# Patient Record
Sex: MALE | NOT HISPANIC OR LATINO
[De-identification: names, ages, dates, MRNs, and addresses within clinical notes are randomized per-mention and may not be internally consistent; named-entity substitution may affect disease eponyms.]

---

## 2020-10-07 PROBLEM — Z00.00 ENCOUNTER FOR PREVENTIVE HEALTH EXAMINATION: Status: ACTIVE | Noted: 2020-10-07

## 2020-10-08 ENCOUNTER — APPOINTMENT (OUTPATIENT)
Dept: SPINE | Facility: CLINIC | Age: 70
End: 2020-10-08

## 2020-10-13 ENCOUNTER — APPOINTMENT (OUTPATIENT)
Dept: SPINE | Facility: CLINIC | Age: 70
End: 2020-10-13
Payer: MEDICARE

## 2020-10-13 VITALS
RESPIRATION RATE: 18 BRPM | DIASTOLIC BLOOD PRESSURE: 66 MMHG | HEIGHT: 67 IN | TEMPERATURE: 97.9 F | BODY MASS INDEX: 27.94 KG/M2 | HEART RATE: 96 BPM | SYSTOLIC BLOOD PRESSURE: 94 MMHG | WEIGHT: 178 LBS | OXYGEN SATURATION: 98 %

## 2020-10-13 DIAGNOSIS — M96.0 PSEUDARTHROSIS AFTER FUSION OR ARTHRODESIS: ICD-10-CM

## 2020-10-13 DIAGNOSIS — I10 ESSENTIAL (PRIMARY) HYPERTENSION: ICD-10-CM

## 2020-10-13 DIAGNOSIS — E78.5 HYPERLIPIDEMIA, UNSPECIFIED: ICD-10-CM

## 2020-10-13 DIAGNOSIS — Z78.9 OTHER SPECIFIED HEALTH STATUS: ICD-10-CM

## 2020-10-13 PROCEDURE — 99204 OFFICE O/P NEW MOD 45 MIN: CPT

## 2020-10-13 RX ORDER — METHADONE HYDROCHLORIDE 5 MG/1
TABLET ORAL
Refills: 0 | Status: ACTIVE | COMMUNITY

## 2020-10-13 RX ORDER — GABAPENTIN 800 MG/1
800 TABLET, COATED ORAL
Refills: 0 | Status: ACTIVE | COMMUNITY

## 2020-10-13 RX ORDER — ATORVASTATIN CALCIUM 80 MG/1
TABLET, FILM COATED ORAL
Refills: 0 | Status: ACTIVE | COMMUNITY

## 2020-10-13 RX ORDER — HYDROCHLOROTHIAZIDE 12.5 MG/1
TABLET ORAL
Refills: 0 | Status: ACTIVE | COMMUNITY

## 2020-10-13 RX ORDER — CYCLOBENZAPRINE HCL 5 MG
TABLET ORAL
Refills: 0 | Status: ACTIVE | COMMUNITY

## 2020-10-13 NOTE — REVIEW OF SYSTEMS
[Tingling] : tingling [Difficulty Walking] : difficulty walking [As Noted in HPI] : as noted in HPI [Negative] : Heme/Lymph

## 2020-10-14 NOTE — DATA REVIEWED
[de-identified] : C/L spine wo on 8/12/2020 @ [facility] [de-identified] : C/L spine wo on 8/12/2020 @ [facility] [de-identified] : C/L spine AP/Lat on 8/12/2020 @ [facility]

## 2020-10-14 NOTE — ASSESSMENT
[FreeTextEntry1] : PLAN\par - Xray scoliosis\par - CT myelogram C/T/L spine\par - refer to Dr. Wilson for SCS consult\par - BACS questionnaire sent to patient's email\par - f/u after images

## 2020-10-14 NOTE — END OF VISIT
[FreeTextEntry3] : It was wonderful to meet the patient today in the office.  He was referred to by office by my partner from [facility].  He has a long history involving both cervical and lumbar surgery.  He has severe neck pain as well as low back pain.  He has residual cervical myelopathy which he says is not bothering him.  He is very active musically and plays the drums every day.  He has severe mechanical back pain with severe shooting pain going down his both legs right worse than left.  I reviewed his films which show a previous L4 S1 fusion with pseudoarthrosis at all levels.  He has nerve root compression.  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  He states that he is unable to sleep.  Furthermore he has a scoliosis and kyphosis which contributes to this coronal deformity.  He does not have scoliosis films but in my analysis of his films I think that he would potentially need a T2 to the pelvis or even a C2 to the pelvis fusion to properly correct this.  Given his medical state and level of activity and treatment preferences I do not think that this is feasible for him.  I recommend a consultation with my colleague for consideration of a spinal cord stimulator.  I think this would be an excellent option for him as neuromodulation would be a great treatment for the thing that bothers him the most which is the severe incapacitating nerve pain.  Also this operation could be done on a outpatient basis then he could get significant pain relief to allow him to resume any of his activities.  He gets along with a Rollator and has some mobility and transportation issues so we will do everything we can to help facilitate his evaluation.  I answered all of his questions to the best of my ability.\par \par Christian Louise M.D., M.Sc.\par \par Department of Neurosurgery\par [facility]\par [facility]\par [facility]\par [place], [place]\par [email or web address]\par [phone] [>50% of the face to face encounter time was spent on counseling and/or coordination of care for ___] : Greater than 50% of the face to face encounter time was spent on counseling and/or coordination of care for [unfilled]

## 2020-10-14 NOTE — PHYSICAL EXAM
[General Appearance - Alert] : alert [General Appearance - In No Acute Distress] : in no acute distress [General Appearance - Well Nourished] : well nourished [General Appearance - Well-Appearing] : healthy appearing [Oriented To Time, Place, And Person] : oriented to person, place, and time [Impaired Insight] : insight and judgment were intact [Affect] : the affect was normal [Memory Recent] : recent memory was not impaired [Person] : oriented to person [Place] : oriented to place [Time] : oriented to time [Motor Tone] : muscle tone was normal in all four extremities [4] : C8 finger flexors 4/5 [5] : S1 flexor hallucis longus 5/5 [Sensation Tactile Decrease] : light touch was intact [Romberg's Sign] : a positive Romberg's sign was present [Limited Balance] : the patient's balance was impaired [3+] : Biceps left 3+ [2+] : Brachioradialis left 2+ [1+] : Ankle jerk left 1+ [Villalba] : Villalba's sign was demonstrated [Able to toe walk] : the patient was not able to toe walk [Able to heel walk] : the patient was not able to heel walk

## 2020-10-14 NOTE — HISTORY OF PRESENT ILLNESS
[de-identified] : Patient is a 71 yo RH M with PMH of HTN, HLD, cervical/lumbar stenosis (h/o cervical spine surgery including C5-7 ACDF in 1980, 2001 and 2009- patient does not remember name of surgeons, L4-S1 decompression/posterior fusion in 8/2018 by Dr. Paulo Craig @ [facility]) presents neurosurgical evaluation.\par He reports persistent lower back pain with LE since his lumbar surgery without improvement. Pain is described as intermittent shooting pain on his lower back radiating to b/l posterior LE which is worsening at night and lying down and unable to recall alleviated factors. He has been seen pain management (Dr. Webster for the past 3 years and has been tx with medications- hydrocodone previously and currently on methadone/gabapentin (800mg TID)/Flexeril (10mg TID) without improvement. He had PT previously for 2 years which aggrevated his symptoms and tried ESIsx 2 times before without relief.  Additionally he c/o moderate left side neck pain to L shoulder and chronic L hand weakness since his second surgery 2001 and also c/o R knee/L elbow limited ROM since 2018 without significant injury which causing mild gait disturbance and difficulty performing ADLs. He denies saddle anesthesia/BB dysfunction. He ambulates with rolling walker.

## 2020-10-20 ENCOUNTER — APPOINTMENT (OUTPATIENT)
Dept: NEUROSURGERY | Facility: CLINIC | Age: 70
End: 2020-10-20
Payer: MEDICARE

## 2020-10-20 DIAGNOSIS — M41.9 SCOLIOSIS, UNSPECIFIED: ICD-10-CM

## 2020-10-20 DIAGNOSIS — S12.9XXA FRACTURE OF NECK, UNSPECIFIED, INITIAL ENCOUNTER: ICD-10-CM

## 2020-10-20 DIAGNOSIS — Z98.1 ARTHRODESIS STATUS: ICD-10-CM

## 2020-10-20 DIAGNOSIS — Z80.9 FAMILY HISTORY OF MALIGNANT NEOPLASM, UNSPECIFIED: ICD-10-CM

## 2020-10-20 DIAGNOSIS — Z82.49 FAMILY HISTORY OF ISCHEMIC HEART DISEASE AND OTHER DISEASES OF THE CIRCULATORY SYSTEM: ICD-10-CM

## 2020-10-20 DIAGNOSIS — M48.02 SPINAL STENOSIS, CERVICAL REGION: ICD-10-CM

## 2020-10-20 DIAGNOSIS — M48.062 SPINAL STENOSIS, LUMBAR REGION WITH NEUROGENIC CLAUDICATION: ICD-10-CM

## 2020-10-20 DIAGNOSIS — S32.009K UNSPECIFIED FRACTURE OF UNSPECIFIED LUMBAR VERTEBRA, SUBSEQUENT ENCOUNTER FOR FRACTURE WITH NONUNION: ICD-10-CM

## 2020-10-20 PROCEDURE — 99072 ADDL SUPL MATRL&STAF TM PHE: CPT

## 2020-10-20 PROCEDURE — 99214 OFFICE O/P EST MOD 30 MIN: CPT

## 2020-10-20 RX ORDER — LOSARTAN POTASSIUM 50 MG/1
50 TABLET, FILM COATED ORAL
Refills: 0 | Status: ACTIVE | COMMUNITY

## 2020-10-20 RX ORDER — AMLODIPINE BESYLATE 5 MG/1
5 TABLET ORAL
Refills: 0 | Status: ACTIVE | COMMUNITY

## 2020-10-20 RX ORDER — ASPIRIN 81 MG
81 TABLET, DELAYED RELEASE (ENTERIC COATED) ORAL
Refills: 0 | Status: ACTIVE | COMMUNITY

## 2020-10-20 NOTE — HISTORY OF PRESENT ILLNESS
[> 3 months] : more  than 3 months [de-identified] : This is a 70 yrs old male who ambulates with a rollator with a history of cervical fusion surgeries (1980, 2001, 2009) and lumbar fusion in 2018 who presents today for a consultation of low back "sensation of tremor" which "shoots' down both legs, posteriorly to the feet since his lumbar spine surgery. He also reports of "sensation of tremor" in his bilateral upper extremities. Denies pain, tingling, weakness. Reports decrease sensation on the right hand. He has failed conservative management such as physical therapy and LAURA. He reports also taking gabapentin and cyclobenzaprine with no improvement. Reports of trouble sleeping, and having to drink a beer to help sleep. He is being followed by Dr. Vasques and Dr. Braden London, pain management. \par \par His case has been reviewed extensively by myself and my colleague Dr. Louise. from a structural point of view deformity correction would require cervico-lumbar fusion. This would be an excessive burden to him. The patient is less concerned regarding pain and deformity and significantly disabled by his refractory radicular/neuropathic pain. He is here to discuss SCS.  [FreeTextEntry1] : low back "tremor" radiating to b/l LE

## 2020-10-20 NOTE — PLAN
[FreeTextEntry1] : I discussed at length the risk, benefits and alternatives of SCS trial in this patient with refractory lumbar radiculopathy/neuropathic pain after failed lumbar fusion. He is interested in pursuing the trial. I spoke with his pain management doctor Layo who is on board. i will see him back if trial is successful.